# Patient Record
Sex: FEMALE | Race: WHITE | NOT HISPANIC OR LATINO | URBAN - NONMETROPOLITAN AREA
[De-identification: names, ages, dates, MRNs, and addresses within clinical notes are randomized per-mention and may not be internally consistent; named-entity substitution may affect disease eponyms.]

---

## 2019-06-26 ENCOUNTER — IMPORTED ENCOUNTER (OUTPATIENT)
Dept: URBAN - NONMETROPOLITAN AREA CLINIC 1 | Facility: CLINIC | Age: 29
End: 2019-06-26

## 2019-06-26 PROCEDURE — 92310 CONTACT LENS FITTING OU: CPT

## 2019-06-26 PROCEDURE — S0620 ROUTINE OPHTHALMOLOGICAL EXA: HCPCS

## 2019-06-26 NOTE — PATIENT DISCUSSION
Myopia/Astigmatism OUDiscussed refractive status with patient. New glasses and contact lens Rx given today. Discussed proper lens care replacment and hygiene in detail.

## 2021-06-16 NOTE — PATIENT DISCUSSION
Cataract surgery has been performed in the first eye and activities of daily living are still impaired. The patient would like to proceed with cataract surgery in the second eye as scheduled. The patient elects Basic OS, goal of Kristy.

## 2022-04-09 ASSESSMENT — TONOMETRY
OS_IOP_MMHG: 16
OD_IOP_MMHG: 15
OS_IOP_MMHG: 16

## 2022-04-09 ASSESSMENT — VISUAL ACUITY
OD_PH: 20/20
OS_CC: 20/40
OD_CC: 20/40
OS_CC: 20/40+
OS_PH: 20/20